# Patient Record
Sex: MALE | Race: WHITE
[De-identification: names, ages, dates, MRNs, and addresses within clinical notes are randomized per-mention and may not be internally consistent; named-entity substitution may affect disease eponyms.]

---

## 2018-08-25 ENCOUNTER — HOSPITAL ENCOUNTER (EMERGENCY)
Dept: HOSPITAL 39 - ER | Age: 83
Discharge: HOME | End: 2018-08-25
Payer: COMMERCIAL

## 2018-08-25 VITALS — OXYGEN SATURATION: 98 % | SYSTOLIC BLOOD PRESSURE: 188 MMHG | TEMPERATURE: 99 F | DIASTOLIC BLOOD PRESSURE: 74 MMHG
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
## 2018-08-25 DIAGNOSIS — S80.11XA: Primary | ICD-10-CM

## 2018-08-25 DIAGNOSIS — I11.0: ICD-10-CM

## 2018-08-25 DIAGNOSIS — I50.9: ICD-10-CM
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
## 2018-08-25 DIAGNOSIS — Z95.1: ICD-10-CM

## 2018-08-25 DIAGNOSIS — Z79.01: ICD-10-CM

## 2018-08-25 NOTE — ED.PDOC
History of Present Illness





- General


Chief Complaint: Lower Extremity Injury


Stated Complaint: right calf pain


Time Seen by Provider: 08/25/18 20:03


Source: patient


Exam Limitations: no limitations





- History of Present Illness


Initial Comments: 





the patient is an 83-year-old  male presenting to the emergency room 

secondary to pain in his posterior upper right calf.  He has some swelling in 

that area.  Yesterday he apparently bumped it hard against the back of a chair.

  He does have a bruise back there.  He is on Coumadin.  He does appear to be 

essentially neurovascularly intact.  No evidence of any compartment syndrome 

clinically.  Pulses are palpable.  Sensation is preserved.  He is able to move 

the ankle and the toes.  He can move the knee though he doesn't like to because 

it hurts behind the knee.  there is no pulsatile palpable masses.  He is not 

pale or dizzy.  No evidence of any spontaneous bleeding elsewhere.  he does not 

have significant pain anteriorly or distally.  No pallor.  No poikilothermia.  

No paresthesias. there is no tenseness that is palpable in the anterior to 

anterior lateral distal lower extremity


Timing/Duration: 24 hours


Severity: mild


Improving Factors: nothing


Worsening Factors: movement


Allergies/Adverse Reactions: 


Allergies





NO KNOWN ALLERGY Allergy (Verified 08/25/18 20:16)


 











Review of Systems





- Review of Systems


Constitutional: States: no symptoms reported


EENTM: States: no symptoms reported


Respiratory: States: no symptoms reported


Cardiology: States: no symptoms reported


Gastrointestinal/Abdominal: States: no symptoms reported


Genitourinary: States: no symptoms reported


Musculoskeletal: States: see HPI


Skin: States: see HPI


Neurological: States: no symptoms reported


Endocrine: States: no symptoms reported


All other Systems: No Change from Baseline





Past Medical History (General)





- Patient Medical History


Hx Cardiac Disorders: Yes


Hx Congestive Heart Failure: Yes


Hx Pacemaker: No


Hx Hypertension: Yes


Hx Diabetes: No


Hx Cancer: No


Hx Hepatitis C: No


Surgical History: coronary bypass surgery





- Vaccination History


Hx Tetanus, Diphtheria Vaccination: No


Hx Influenza Vaccination: No


Hx Pneumococcal Vaccination: No


Immunizations Up to Date: No





- Social History


Hx Alcohol Use: No


Hx Substance Use: No


Hx Substance Use Treatment: No


Hx Depression: No





Family Medical History





- Family History


  ** Mother


Family History: Unknown





Physical Exam





- Physical Exam


General Appearance: Alert, Comfortable, No apparent distress


Eye Exam: bilateral normal


Ears, Nose, Throat: normal ENT inspection, normal pharynx, other - he does have 

chronic hearing decreased bilateral


Neck: full range of motion, supple


Respiratory: lungs clear, normal breath sounds, no respiratory distress, no 

accessory muscle use


Cardiovascular/Chest: normal peripheral pulses, no edema, other - regular rate


Peripheral Pulses: radial,right: 2+, radial,left: 2+, dorsalis pedis,right: 2+, 

dorsalis pedis,left: 2+, posterior tibialis,right: 2+, posterior tibialis,left: 

2+


Gastrointestinal/Abdominal: non tender, soft


Rectal Exam: deferred


Back Exam: no CVA tenderness, no vertebral tenderness


Extremity: no pedal edema, normal capillary refill, swelling, other - see 

history of present illness


Neurologic: CNs II-XII nml as tested, alert, normal mood/affect, oriented x 3


Skin Exam: normal color - with the exception of the bruising behind the right 

calf.


Comments: 





 Vital Signs - 24 hr











  08/25/18





  20:01


 


Temperature 99.0 F


 


Pulse Rate [ 90





monitor] 


 


Respiratory 20





Rate 


 


Blood Pressure 188/74





[la] 


 


O2 Sat by Pulse 98





Oximetry 














Progress





- Progress


Progress: 





08/25/18 20:51


the patient's an 83-year-old  male presenting to the emergency room 

secondary to hematoma formation behind the right upper calf and knee over the 

last 24 hours secondary to blunt trauma.  He is on Coumadin.  He does need to 

hold his Coumadin for the next 3 days.  vitamin K does not appear to be 

warranted at this time.We did apply a mild pressure wrap to this, starting from 

the toes up, and it does need to be adjusted every 4-6 hours.  no evidence of 

compartment syndrome at this time.  He is mildly anemic with an H&H of 9 and 

27.  I do suspect that this is long-standing.  He does need follow-up with his 

primary care doctor on Monday for repeat clinical evaluation and a repeat H&H.  

He can continue using his crutches for now with some mild weightbearing.  He 

does need to continue to move the ankle the hip and the foot to keep blood 

circulating.  He can even move the knee a little bit if he wants to.  ER 

warnings are given for any significant worsening.





- Results/Orders


Results/Orders: 





 Laboratory Results - last 24 hr











  08/25/18 08/25/18 08/25/18





  20:03 20:03 20:03


 


WBC  9.3  


 


RBC  3.00 L  


 


Hgb  9.4 L  


 


Hct  27.6 L  


 


MCV  92.0  


 


MCH  31.3 H  


 


MCHC  33.9  


 


RDW  24.2 H  


 


Plt Count  174  


 


MPV  10.1  


 


Absolute Neuts (auto)  6.30  


 


Absolute Lymphs (auto)  1.50  


 


Absolute Monos (auto)  1.00 H  


 


Absolute Eos (auto)  0.40  


 


Absolute Basos (auto)  0.10  


 


Neutrophils %  67.6  


 


Neutrophils % (Manual)  63.0  


 


Lymphocytes %  15.8 L  


 


Lymphocytes % (Manual)  21.0  


 


Monocytes %  10.9 H  


 


Monocytes % (Manual)  8.0  


 


Eosinophils %  4.4  


 


Basophils %  1.3  


 


Band Neutrophils  8.0 H  


 


Hypochromia  2+  


 


Platelet Estimate  Normal  


 


Polychromasia  1+  


 


Poikilocytosis  4+  


 


Anisocytosis  4+  


 


Schistocytes  3+  


 


PT   22.2 H 


 


INR   2.24 H 


 


PTT (SP)   34.7 H 


 


Sodium    143


 


Potassium    4.1


 


Chloride    111


 


Carbon Dioxide    27


 


Anion Gap    9.1 L


 


BUN    28 H


 


Creatinine    1.23


 


BUN/Creatinine Ratio    22.8 H


 


Random Glucose    132 H


 


Serum Osmolality    292.3


 


Calcium    8.9


 


Total Bilirubin    0.9


 


AST    20


 


ALT    16


 


Alkaline Phosphatase    55


 


Serum Total Protein    6.1 L


 


Albumin    3.7


 


Globulin    2.4


 


Albumin/Globulin Ratio    1.5














Departure





- Departure


Clinical Impression: 


Hematoma of lower limb


Qualifiers:


 Encounter type: initial encounter Laterality: right Qualified Code(s): 

S80.11XA - Contusion of right lower leg, initial encounter





Disposition: Discharge to Home or Self Care


Condition: Fair


Departure Forms:  ED Discharge - Pt. Copy, Patient Portal Self Enrollment


Instructions:  DI for Leg Pain, DI for Trauma


Diet: regular diet


Activity: other


Additional Instructions: 


the patient's an 83-year-old  male presenting to the emergency room 

secondary to hematoma formation behind the right upper calf and knee over the 

last 24 hours secondary to blunt trauma.  He is on Coumadin.  He does need to 

hold his Coumadin for the next 3 days.  vitamin K does not appear to be 

warranted at this time.We did apply a mild pressure wrap to this, starting from 

the toes up, and it does need to be adjusted every 4-6 hours.  no evidence of 

compartment syndrome at this time.  He is mildly anemic with an H&H of 9 and 

27.  I do suspect that this is long-standing.  He does need follow-up with his 

primary care doctor on Monday for repeat clinical evaluation and a repeat H&H.  

He can continue using his crutches for now with some mild weightbearing.  He 

does need to continue to move the ankle the hip and the foot to keep blood 

circulating.  He can even move the knee a little bit if he wants to.  ER 

warnings are given for any significant worsening.